# Patient Record
Sex: MALE | Race: BLACK OR AFRICAN AMERICAN
[De-identification: names, ages, dates, MRNs, and addresses within clinical notes are randomized per-mention and may not be internally consistent; named-entity substitution may affect disease eponyms.]

---

## 2019-12-03 ENCOUNTER — HOSPITAL ENCOUNTER (EMERGENCY)
Dept: HOSPITAL 63 - ER | Age: 19
LOS: 1 days | Discharge: HOME | End: 2019-12-04
Payer: SELF-PAY

## 2019-12-03 VITALS — WEIGHT: 167 LBS | HEIGHT: 72 IN | BODY MASS INDEX: 22.62 KG/M2

## 2019-12-03 VITALS — SYSTOLIC BLOOD PRESSURE: 117 MMHG | DIASTOLIC BLOOD PRESSURE: 67 MMHG

## 2019-12-03 DIAGNOSIS — K21.9: Primary | ICD-10-CM

## 2019-12-03 LAB
ALBUMIN SERPL-MCNC: 4.3 G/DL (ref 3.4–5)
ALBUMIN/GLOB SERPL: 1.1 {RATIO} (ref 1–1.7)
ALP SERPL-CCNC: 44 U/L (ref 46–116)
ALT SERPL-CCNC: 29 U/L (ref 16–63)
ANION GAP SERPL CALC-SCNC: 16 MMOL/L (ref 6–14)
APTT PPP: YELLOW S
AST SERPL-CCNC: 24 U/L (ref 15–37)
BACTERIA #/AREA URNS HPF: 0 /HPF
BASOPHILS # BLD AUTO: 0.1 X10^3/UL (ref 0–0.2)
BASOPHILS NFR BLD: 1 % (ref 0–3)
BILIRUB SERPL-MCNC: 3.4 MG/DL (ref 0.2–1)
BILIRUB UR QL STRIP: (no result)
BUN/CREAT SERPL: 17 (ref 6–20)
CA-I SERPL ISE-MCNC: 15 MG/DL (ref 8–26)
CALCIUM SERPL-MCNC: 9.3 MG/DL (ref 8.5–10.1)
CHLORIDE SERPL-SCNC: 103 MMOL/L (ref 98–107)
CO2 SERPL-SCNC: 24 MMOL/L (ref 21–32)
CREAT SERPL-MCNC: 0.9 MG/DL (ref 0.7–1.3)
EOSINOPHIL NFR BLD: 0.2 X10^3/UL (ref 0–0.7)
EOSINOPHIL NFR BLD: 3 % (ref 0–3)
ERYTHROCYTE [DISTWIDTH] IN BLOOD BY AUTOMATED COUNT: 12.9 % (ref 11.5–14.5)
FIBRINOGEN PPP-MCNC: CLEAR MG/DL
GFR SERPLBLD BASED ON 1.73 SQ M-ARVRAT: 130.2 ML/MIN
GLOBULIN SER-MCNC: 3.9 G/DL (ref 2.2–3.8)
GLUCOSE SERPL-MCNC: 86 MG/DL (ref 70–99)
GLUCOSE UR STRIP-MCNC: (no result) MG/DL
HCT VFR BLD CALC: 43.3 % (ref 39–53)
HGB BLD-MCNC: 14.7 G/DL (ref 13–17.5)
LYMPHOCYTES # BLD: 2.8 X10^3/UL (ref 1–4.8)
LYMPHOCYTES NFR BLD AUTO: 32 % (ref 24–48)
MCH RBC QN AUTO: 28 PG (ref 25–35)
MCHC RBC AUTO-ENTMCNC: 34 G/DL (ref 31–37)
MCV RBC AUTO: 82 FL (ref 79–100)
MONO #: 0.8 X10^3/UL (ref 0–1.1)
MONOCYTES NFR BLD: 9 % (ref 0–9)
NEUT #: 4.7 X10^3UL (ref 1.8–7.7)
NEUTROPHILS NFR BLD AUTO: 55 % (ref 31–73)
NITRITE UR QL STRIP: (no result)
PLATELET # BLD AUTO: 197 X10^3/UL (ref 140–400)
POTASSIUM SERPL-SCNC: 3.2 MMOL/L (ref 3.5–5.1)
PROT SERPL-MCNC: 8.2 G/DL (ref 6.4–8.2)
RBC # BLD AUTO: 5.28 X10^6/UL (ref 4.3–5.7)
RBC #/AREA URNS HPF: (no result) /HPF (ref 0–2)
SODIUM SERPL-SCNC: 143 MMOL/L (ref 136–145)
SP GR UR STRIP: >=1.03
SQUAMOUS #/AREA URNS LPF: (no result) /LPF
UROBILINOGEN UR-MCNC: 1 MG/DL
WBC # BLD AUTO: 8.6 X10^3/UL (ref 4–11)
WBC #/AREA URNS HPF: (no result) /HPF (ref 0–4)

## 2019-12-03 PROCEDURE — 99285 EMERGENCY DEPT VISIT HI MDM: CPT

## 2019-12-03 PROCEDURE — 81001 URINALYSIS AUTO W/SCOPE: CPT

## 2019-12-03 PROCEDURE — 36415 COLL VENOUS BLD VENIPUNCTURE: CPT

## 2019-12-03 PROCEDURE — 96374 THER/PROPH/DIAG INJ IV PUSH: CPT

## 2019-12-03 PROCEDURE — 80053 COMPREHEN METABOLIC PANEL: CPT

## 2019-12-03 PROCEDURE — 87086 URINE CULTURE/COLONY COUNT: CPT

## 2019-12-03 PROCEDURE — 74177 CT ABD & PELVIS W/CONTRAST: CPT

## 2019-12-03 PROCEDURE — 85025 COMPLETE CBC W/AUTO DIFF WBC: CPT

## 2019-12-03 NOTE — RAD
Exam: CT abdomen and pelvis with contrast

 

INDICATION: Abdominal pain

 

TECHNIQUE: Sequential axial images through the abdomen and pelvis obtained

following the administration of 75 mL of Omni 300 IV contrast. Sagittal 

and coronal reformatted images were reconstructed from the axial data and 

reviewed.

 

Comparisons: None

 

FINDINGS:

Heart size is normal. No pericardial effusion. Visualized lung bases are 

clear. No pleural effusion.

 

Liver, spleen, pancreas, gallbladder and adrenals are unremarkable.

 

Kidneys demonstrate symmetric enhancement. No perinephric inflammation or 

hydronephrosis. No renal or ureteral calculi are identified.

 

Bladder is decompressed not well evaluated. Prostate is not enlarged.

 

Large and small bowel are unremarkable. Appendix is normal. No free 

intraabdominal air or fluid. No obstruction.

 

Abdominal aorta has a normal course and caliber. Abdominal vasculature is 

patent.

 

No enlarged intra-abdominal lymph nodes are identified.

 

No suspicious osseous lesions or acute fractures.

 

IMPRESSION:

No acute process identified within the abdomen or pelvis.

 

 

Exposure: One or more of the following in the visualized dose reduction 

techniques were utilized for this examination:

1.  Automated exposure control

2.  Adjustment of the MA and/or KV according to patient size

3.  Use of iterative of reconstructive technique

 

Electronically signed by: Patricia Sotomayor MD (12/3/2019 11:21 PM) [facility]

## 2019-12-03 NOTE — PHYS DOC
Adult General


Chief Complaint


Chief Complaint:  ABDOMINAL PAIN





HPI


HPI


19-year-old male presents with nausea and diffuse abdominal pain for the last 1 

month. The patient's been feeling on and off ill over this time. Over the last 

week, he feels it is becoming more consistent labs. When he wakes up in the m

orning, he feels pretty normal. As the morning progresses, he develops a nausea 

that lasts most of the rest of the day all day. He gets some intermittent 

abdominal discomfort, but doesn't really describe it as pain. It is generalized 

in location. The nausea and abdominal discomfort definite gets worse after he 

eats. It will then fade over the next couple of hours, but did not go away. The 

patient is not taking any medications. He has no history of stomach problems. He

does have a family history of Crohn's disease in first-degree relatives. He has 

never been tested. He does not have bloody diarrhea. For the last few days he 

has been constipated, but he took magnesium citrate yesterday. He's had several 

bowel movements since then and does not feel any better. He denies fever or 

chills. He has been eating and drinking normally. He admits to not having a very

healthy diet. Denies dysuria, urinary frequency, urinary discharge, history of 

STD.





Review of Systems


Review of Systems





Constitutional: Denies fever or chills []


Eyes: Denies change in visual acuity, redness, or eye pain []


HENT: Denies nasal congestion or sore throat []


Respiratory: Denies cough or shortness of breath []


Cardiovascular: No additional information not addressed in HPI []


GI: Generalized abdominal pain, nausea. Denies vomiting, bloody stools or 

diarrhea []


: Denies dysuria or hematuria []


Musculoskeletal: Denies back pain or joint pain []


Integument: Denies rash or skin lesions []


Neurologic: Denies headache, focal weakness or sensory changes []


Endocrine: Denies polyuria or polydipsia []





All other systems were reviewed and found to be within normal limits, except as 

documented in this note.





Current Medications


Current Medications





Current Medications








 Medications


  (Trade)  Dose


 Ordered  Sig/Marshal  Start Time


 Stop Time Status Last Admin


Dose Admin


 


 Ondansetron HCl


  (Zofran)  4 mg  1X  ONCE  12/3/19 21:45


 12/3/19 21:46 UNV  





 


 Sodium Chloride  1,000 ml @ 


 1,000 mls/hr  1X  ONCE  12/3/19 21:45


 12/3/19 22:44 UNV  














Physical Exam


Physical Exam





Constitutional: Well developed, well nourished, no acute distress, non-toxic 

appearance. []


HENT: Normocephalic, atraumatic, bilateral external ears normal, oropharynx 

moist, no oral exudates, nose normal. []


Eyes: PERRLA, EOMI, conjunctiva normal, no discharge. [] 


Neck: Normal range of motion, no tenderness, supple, no stridor. [] 


Cardiovascular:Heart rate regular rhythm, no murmur []


Lungs & Thorax:  Bilateral breath sounds clear to auscultation []


Abdomen: Bowel sounds normal, soft, no tenderness, no masses, no pulsatile 

masses. [] 


Skin: Warm, dry, no erythema, no rash. [] 


Back: No tenderness, no CVA tenderness. [] 


Extremities: No tenderness, no cyanosis, no clubbing, ROM intact, no edema. [] 


Neurologic: Alert and oriented X 3, normal motor function, normal sensory 

function, no focal deficits noted. []


Psychologic: Affect normal, judgement normal, mood normal. []





EKG


EKG


[]





Radiology/Procedures


Radiology/Procedures


[]





Course & Med Decision Making


Course & Med Decision Making


Pertinent Labs and Imaging studies reviewed. (See chart for details)


Patient's labs are unremarkable. His urinalysis is unremarkable. His CT of the 

abdomen pelvis is unremarkable. The patient was given 4 mg of Zofran IV and a 

liter of fluid. This could be GERD. I will give the patient Pepcid IV, Protonix 

IV, and a GI cocktail. I will discharge him with a prescription for Zofran and a

 recommendation to take over-the-counter Nexium. Stated verbal understanding. He

 is stable for discharge at this time.


[]





Dragon Disclaimer


Dragon Disclaimer


This electronic medical record was generated, in whole or in part, using a voice

 recognition dictation system.





Departure


Departure:


Impression:  


   Primary Impression:  


   Nausea


   Additional Impression:  


   GERD (gastroesophageal reflux disease)


Disposition:  01 HOME, SELF-CARE


Condition:  STABLE


Referrals:  


PCP,NO (PCP)


Patient Instructions:  Gastroesophageal Reflux Disease, Adult, Easy-to-Read, 

Nausea, Adult, Easy-to-Read





Additional Instructions:  


Please take one generic Nexium 20 mg tablet daily for the next 2 weeks.


Scripts


Ondansetron (ONDANSETRON ODT) 4 Mg Tab.rapdis


1 TAB PO PRN Q6-8HRS PRN for VOMITING, #16 TAB


   Prov: JOSE RODRIGEZ DO         12/3/19





Problem Qualifiers








   Additional Impression:  


   GERD (gastroesophageal reflux disease)


   Esophagitis presence:  without esophagitis  Qualified Codes:  K21.9 - Gastro-

   esophageal reflux disease without esophagitis








JOSE RODRIGEZ DO                  Dec 3, 2019 21:50